# Patient Record
Sex: MALE | Race: WHITE
[De-identification: names, ages, dates, MRNs, and addresses within clinical notes are randomized per-mention and may not be internally consistent; named-entity substitution may affect disease eponyms.]

---

## 2021-08-06 ENCOUNTER — HOSPITAL ENCOUNTER (OUTPATIENT)
Dept: HOSPITAL 61 - ENDOS | Age: 61
Discharge: HOME | End: 2021-08-06
Attending: INTERNAL MEDICINE
Payer: COMMERCIAL

## 2021-08-06 VITALS — BODY MASS INDEX: 28.41 KG/M2 | HEIGHT: 70 IN | WEIGHT: 198.42 LBS

## 2021-08-06 VITALS
DIASTOLIC BLOOD PRESSURE: 65 MMHG | DIASTOLIC BLOOD PRESSURE: 65 MMHG | SYSTOLIC BLOOD PRESSURE: 142 MMHG | SYSTOLIC BLOOD PRESSURE: 142 MMHG

## 2021-08-06 VITALS — SYSTOLIC BLOOD PRESSURE: 148 MMHG | DIASTOLIC BLOOD PRESSURE: 82 MMHG

## 2021-08-06 DIAGNOSIS — Z72.89: ICD-10-CM

## 2021-08-06 DIAGNOSIS — K63.89: ICD-10-CM

## 2021-08-06 DIAGNOSIS — K92.2: Primary | ICD-10-CM

## 2021-08-06 DIAGNOSIS — K92.1: ICD-10-CM

## 2021-08-06 DIAGNOSIS — D12.2: ICD-10-CM

## 2021-08-06 DIAGNOSIS — D12.5: ICD-10-CM

## 2021-08-06 DIAGNOSIS — I10: ICD-10-CM

## 2021-08-06 DIAGNOSIS — Z79.899: ICD-10-CM

## 2021-08-06 DIAGNOSIS — E78.00: ICD-10-CM

## 2021-08-06 PROCEDURE — 45385 COLONOSCOPY W/LESION REMOVAL: CPT

## 2021-08-06 PROCEDURE — 45380 COLONOSCOPY AND BIOPSY: CPT

## 2021-08-06 PROCEDURE — 88305 TISSUE EXAM BY PATHOLOGIST: CPT

## 2021-08-09 NOTE — PATHOLOGY
[facility] Accession Number: [id number]

.                                                                01

Material submitted:                                        .

PART A: colon - ASCENDING COLON POLYP. Modifiers: ascending

PART B: sigmoid colon - SIGMOID POLYP

.                                                                01

Clinician provided ICD-10:

K62.5

.                                                                01

Clinical history:                                          .

SCREENING/COLONOSCOPY/RECTAL BLEEDING

.

.                                                                02

**********************************************************************

Diagnosis:

A.  Colon biopsies, ascending colon polyp:

- Tubular adenoma.

.

B.  Colon biopsies, sigmoid polyp:

- Tubular adenoma.

(JPM:ander; 08/09/2021)

S  08/09/2021  1151 Local

**********************************************************************

.                                                                02

Comment:

There is no high grade dysplasia or evidence of malignancy.

(JPM:ander; 08/09/2021)

.                                                                02

Electronically signed:                                     .

Caleb Strauss MD, Pathologist

NPI- [id number]

.                                                                01

Gross description:                                         .

A.  The specimen is submitted in formalin, labeled "Cheng, Uriel,

ascending colon polyp". Received are 4 segments of pale tan tissue ranging

in size from 0.2 to 0.4 cm in maximum dimensions. The specimen is

submitted in cassette A1.

.

B.  The specimen is submitted in formalin, labeled "Cheng, Uriel,

sigmoid polyp". Received are 2 segments of pale tan tissue ranging in size

from 0.3 to 0.4 cm in maximum dimensions. The specimen is submitted in

cassette B1.

([facility]; 8/6/2021)

NRI/NRI  08/06/2021  1731 Local

.                                                                02

Pathologist provided ICD-10:

D12.2, D12.5

.                                                                02

CPT                                                        .

211949, 924519

Specimen Comment: Report sent to  / DR JOHANSEN

Specimen Comment: A duplicate report has been generated due to demographic updates.

***Performed at:  01

   [facility] [address], [place]  [id number]

   MD Aldair Wheeler MD Phone:  [phone]

***Performed at:  02

   [facility]

   [address], [place]  [id number]

   MD Caleb Strauss MD Phone:  [phone]

## 2021-12-07 ENCOUNTER — HOSPITAL ENCOUNTER (OUTPATIENT)
Dept: HOSPITAL 63 - LAB | Age: 61
End: 2021-12-07
Attending: PHYSICIAN ASSISTANT
Payer: COMMERCIAL

## 2021-12-07 DIAGNOSIS — Z00.00: Primary | ICD-10-CM

## 2021-12-07 DIAGNOSIS — R35.0: ICD-10-CM

## 2021-12-07 DIAGNOSIS — I25.10: ICD-10-CM

## 2021-12-07 DIAGNOSIS — E78.5: ICD-10-CM

## 2021-12-07 LAB
ALBUMIN SERPL-MCNC: 3.6 G/DL (ref 3.4–5)
ALBUMIN/GLOB SERPL: 1.1 {RATIO} (ref 1–1.7)
ALP SERPL-CCNC: 94 U/L (ref 46–116)
ALT SERPL-CCNC: 39 U/L (ref 16–63)
ANION GAP SERPL CALC-SCNC: 7 MMOL/L (ref 6–14)
AST SERPL-CCNC: 27 U/L (ref 15–37)
BASOPHILS # BLD AUTO: 0 X10^3/UL (ref 0–0.2)
BASOPHILS NFR BLD: 0 % (ref 0–3)
BILIRUB SERPL-MCNC: 0.7 MG/DL (ref 0.2–1)
BUN/CREAT SERPL: 22 (ref 6–20)
CA-I SERPL ISE-MCNC: 20 MG/DL (ref 8–26)
CALCIUM SERPL-MCNC: 8.9 MG/DL (ref 8.5–10.1)
CHLORIDE SERPL-SCNC: 104 MMOL/L (ref 98–107)
CO2 SERPL-SCNC: 28 MMOL/L (ref 21–32)
CREAT SERPL-MCNC: 0.9 MG/DL (ref 0.7–1.3)
EOSINOPHIL NFR BLD: 0.2 X10^3/UL (ref 0–0.7)
EOSINOPHIL NFR BLD: 4 % (ref 0–3)
ERYTHROCYTE [DISTWIDTH] IN BLOOD BY AUTOMATED COUNT: 13 % (ref 11.5–14.5)
GFR SERPLBLD BASED ON 1.73 SQ M-ARVRAT: 86.1 ML/MIN
GLOBULIN SER-MCNC: 3.4 G/DL (ref 2.2–3.8)
GLUCOSE SERPL-MCNC: 93 MG/DL (ref 70–99)
HCT VFR BLD CALC: 42.8 % (ref 39–53)
HGB BLD-MCNC: 14.4 G/DL (ref 13–17.5)
LYMPHOCYTES # BLD: 2.1 X10^3/UL (ref 1–4.8)
LYMPHOCYTES NFR BLD AUTO: 33 % (ref 24–48)
MCH RBC QN AUTO: 32 PG (ref 25–35)
MCHC RBC AUTO-ENTMCNC: 34 G/DL (ref 31–37)
MCV RBC AUTO: 96 FL (ref 79–100)
MONO #: 0.4 X10^3/UL (ref 0–1.1)
MONOCYTES NFR BLD: 6 % (ref 0–9)
NEUT #: 3.7 X10^3UL (ref 1.8–7.7)
NEUTROPHILS NFR BLD AUTO: 57 % (ref 31–73)
PLATELET # BLD AUTO: 219 X10^3/UL (ref 140–400)
POTASSIUM SERPL-SCNC: 4.3 MMOL/L (ref 3.5–5.1)
PROT SERPL-MCNC: 7 G/DL (ref 6.4–8.2)
RBC # BLD AUTO: 4.47 X10^6/UL (ref 4.3–5.7)
SODIUM SERPL-SCNC: 139 MMOL/L (ref 136–145)
WBC # BLD AUTO: 6.5 X10^3/UL (ref 4–11)

## 2021-12-07 PROCEDURE — 36415 COLL VENOUS BLD VENIPUNCTURE: CPT

## 2021-12-07 PROCEDURE — G0103 PSA SCREENING: HCPCS

## 2021-12-07 PROCEDURE — 84439 ASSAY OF FREE THYROXINE: CPT

## 2021-12-07 PROCEDURE — 85025 COMPLETE CBC W/AUTO DIFF WBC: CPT

## 2021-12-07 PROCEDURE — 80061 LIPID PANEL: CPT

## 2021-12-07 PROCEDURE — 80053 COMPREHEN METABOLIC PANEL: CPT

## 2021-12-07 PROCEDURE — 84443 ASSAY THYROID STIM HORMONE: CPT

## 2021-12-07 PROCEDURE — 84153 ASSAY OF PSA TOTAL: CPT

## 2021-12-08 LAB
CHOLEST/HDLC SERPL: 2 {RATIO}
HDLC SERPL-MCNC: 67 MG/DL (ref 40–60)
LDLC: 115 MG/DL (ref 0–100)
T4 FREE SERPL-MCNC: 0.78 NG/DL (ref 0.76–1.46)
THYROID STIM HORMONE (TSH): 1.75 UIU/ML (ref 0.36–3.74)
TRIGL SERPL-MCNC: 58 MG/DL (ref 0–150)
VLDLC: 11 MG/DL (ref 0–40)

## 2022-01-06 ENCOUNTER — HOSPITAL ENCOUNTER (OUTPATIENT)
Dept: HOSPITAL 61 - KCIC CT | Age: 62
End: 2022-01-06
Attending: PHYSICIAN ASSISTANT
Payer: COMMERCIAL

## 2022-01-06 DIAGNOSIS — E78.5: ICD-10-CM

## 2022-01-06 DIAGNOSIS — I25.10: Primary | ICD-10-CM

## 2022-01-06 PROCEDURE — 75571 CT HRT W/O DYE W/CA TEST: CPT

## 2022-01-06 NOTE — [facility]
CT for coronary artery calcium scoring, without IV contrast,  1/6/2022 3:44 PM



INDICATION: Reason: Cardiovascular screening, hyperlipidemia. / Spl. Instructions:  / History: 



Technique: Noncontrast CT images through the coronary arteries was performed . 



Findings:



No significant noncardiac findings are identified. 



Visual inspection of the coronary arteries demonstrates calcification in the left anterior descending
 artery.



The remaining coronary arteries are without discernible calcified plaque.



The computer-generated calcium scoring utilizing  AJ-130 criteria are as follows:

Left Main Artery: 0.

Left Anterior Descending Artery: 140.3.

Left Circumflex Artery: 0.

Right Coronary Artery: 0.



The total calcium score is 140.3.





Impression: 



Your total calcium score is  140.3.A moderate amount of plaque is present. This correlates with heart
 disease, and a moderate to high risk for a myocardial infarction.







  



Table:



0: No plaque is present. The chance of significant heart disease is less than 5%, and corresponds to 
a very low risk for a myocardial infarction.



1-10: A small amount of plaque is present. The chance of significant heart disease is less than 10%, 
and corresponds to a low risk for a myocardial infarction.



: Plaque is present. This correlates with mild heart disease and a moderate risk for a myocardi
al infarction.



101-400: A moderate amount of plaque is present. This correlates with heart disease, and a moderate t
o high risk for a myocardial infarction.



Over 400: A large amount of plaque is present.  This correlates with a greater than 90% chance of a h
igh grade stenosis. The risk for myocardial infarction is high. 



Electronically signed by: Tristen Gonsalez MD (1/6/2022 3:46 PM) [id number]